# Patient Record
Sex: FEMALE | Race: WHITE
[De-identification: names, ages, dates, MRNs, and addresses within clinical notes are randomized per-mention and may not be internally consistent; named-entity substitution may affect disease eponyms.]

---
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
## 2019-04-06 ENCOUNTER — HOSPITAL ENCOUNTER (EMERGENCY)
Dept: HOSPITAL 61 - ER | Age: 19
Discharge: HOME | End: 2019-04-06
Payer: COMMERCIAL

## 2019-04-06 VITALS — HEIGHT: 65 IN | WEIGHT: 185 LBS | BODY MASS INDEX: 30.82 KG/M2

## 2019-04-06 VITALS — SYSTOLIC BLOOD PRESSURE: 146 MMHG | DIASTOLIC BLOOD PRESSURE: 75 MMHG

## 2019-04-06 DIAGNOSIS — N39.0: Primary | ICD-10-CM

## 2019-04-06 LAB
APTT PPP: YELLOW S
BACTERIA #/AREA URNS HPF: (no result) /HPF
BILIRUB UR QL STRIP: NEGATIVE
FIBRINOGEN PPP-MCNC: (no result) MG/DL
NITRITE UR QL STRIP: POSITIVE
PH UR STRIP: 6 [PH]
PROT UR STRIP-MCNC: 30 MG/DL
RBC #/AREA URNS HPF: (no result) /HPF (ref 0–2)
SQUAMOUS #/AREA URNS LPF: (no result) /LPF
UROBILINOGEN UR-MCNC: 0.2 MG/DL
WBC #/AREA URNS HPF: (no result) /HPF (ref 0–4)

## 2019-04-06 PROCEDURE — 87186 SC STD MICRODIL/AGAR DIL: CPT

## 2019-04-06 PROCEDURE — 81001 URINALYSIS AUTO W/SCOPE: CPT

## 2019-04-06 PROCEDURE — 81025 URINE PREGNANCY TEST: CPT

## 2019-04-06 PROCEDURE — 87086 URINE CULTURE/COLONY COUNT: CPT

## 2019-04-06 PROCEDURE — 99283 EMERGENCY DEPT VISIT LOW MDM: CPT

## 2019-04-06 NOTE — PHYS DOC
Past Medical History


Past Medical History:  No Pertinent History


 (SHILPI GARZA APRBIANCA)


Past Surgical History:  No Surgical History


Additional Past Surgical Histo:  CARDIAC ABLATION for Michele-Parkinsons-White


 (SHILPI GARZA APRBIANCA)


Alcohol Use:  Occasionally


Drug Use:  Marijuana


 (SHILPI GARZA APRBIANCA)





Adult General


Chief Complaint


Chief Complaint:  PAIN ON URINATION





[facility]


HPI





Patient is a 18  year old female with no significant medical history who 

presents to the ED today complaining of dysuria for 2 weeks. Patient denies any 

other symptoms.


 (SHILPI GARZA)





Review of Systems


Review of Systems





Constitutional: Denies fever or chills []


Eyes: Denies change in visual acuity, redness, or eye pain []


HENT: Denies nasal congestion or sore throat []


Respiratory: Denies cough or shortness of breath []


Cardiovascular: No additional information not addressed in HPI []


GI: Denies abdominal pain, nausea, vomiting, bloody stools or diarrhea []


: Reports dysuria, denies hematuria []


Musculoskeletal: Denies back pain or joint pain []


Integument: Denies rash or skin lesions []


Neurologic: Denies headache, focal weakness or sensory changes []








All other systems were reviewed and found to be within normal limits, except as 

documented in this note.


 (SHILPI GARZA APRBIANCA)





Allergies


Allergies





Allergies








Coded Allergies Type Severity Reaction Last Updated Verified


 


  No Known Drug Allergies    12/1/17 No





 (LUCRETIA FARMER MD)





Physical Exam


Physical Exam





Constitutional: Well developed, well nourished, no acute distress, non-toxic 

appearance. []


HENT: Normocephalic, atraumatic, bilateral external ears normal, oropharynx 

moist, no oral exudates, nose normal. []


Eyes: PERRLA, EOMI, conjunctiva normal, no discharge. [] 


Neck: Normal range of motion, no tenderness, supple, no stridor. [] 


Cardiovascular:Heart rate regular rhythm, no murmur []


Lungs & Thorax:  Bilateral breath sounds clear to auscultation []


Abdomen: Bowel sounds normal, soft, no tenderness, no masses, no pulsatile 

masses. [] 


Skin: Warm, dry, no erythema, no rash. [] 


Back: No tenderness, no CVA tenderness. [] 


Extremities: No tenderness, no cyanosis, no clubbing, ROM intact, no edema. [] 


Neurologic: Alert and oriented X 3, normal motor function, normal sensory 

function, no focal deficits noted. []


Psychologic: Affect normal, judgement normal, mood normal. []


 (SHILPI GARZA)





Current Patient Data


Vital Signs





 Vital Signs








  Date Time  Temp Pulse Resp B/P (MAP) Pulse Ox O2 Delivery O2 Flow Rate FiO2


 


4/6/19 16:20 98.5 89 19 146/75 (98) 98 Room Air  





 98.5       





 (LUCRETIA FARMER MD)


Lab Values





 Laboratory Tests








Test


 4/6/19


16:11 4/6/19


16:39


 


Urine Collection Type Unknown   


 


Urine Color Yellow   


 


Urine Clarity Cloudy   


 


Urine pH 6.0   


 


Urine Specific Gravity 1.025   


 


Urine Protein


 30 mg/dL


(NEG-TRACE) 





 


Urine Glucose (UA)


 Negative mg/dL


(NEG) 





 


Urine Ketones (Stick)


 Negative mg/dL


(NEG) 





 


Urine Blood


 Moderate (NEG)


 





 


Urine Nitrite


 Positive (NEG)


 





 


Urine Bilirubin


 Negative (NEG)


 





 


Urine Urobilinogen Dipstick


 0.2 mg/dL (0.2


mg/dL) 





 


Urine Leukocyte Esterase Large (NEG)   


 


Urine RBC


 1-2 /HPF (0-2)


 





 


Urine WBC


 Tntc /HPF


(0-4) 





 


Urine Squamous Epithelial


Cells Many /LPF  


 





 


Urine Bacteria


 Many /HPF


(0-FEW) 





 


Urine Mucus Marked /LPF   


 


POC Urine HCG, Qualitative


 


 Hcg negative


(Negative)








Microbiology


4/6/19 Urine Culture - Final, Complete


         


4/6/19 Urine Culture Result 1 (DUANE) - Final, Complete


         


4/6/19 Antimicrobic Susceptibility - Final, Complete


         


 (LUCRETIA FARMER MD)


Lab Values





 Laboratory Tests








Test


 4/6/19


16:11 4/6/19


16:39


 


Urine Collection Type Unknown   


 


Urine Color Yellow   


 


Urine Clarity Cloudy   


 


Urine pH 6.0   


 


Urine Specific Gravity 1.025   


 


Urine Protein


 30 mg/dL


(NEG-TRACE) 





 


Urine Glucose (UA)


 Negative mg/dL


(NEG) 





 


Urine Ketones (Stick)


 Negative mg/dL


(NEG) 





 


Urine Blood


 Moderate (NEG)


 





 


Urine Nitrite


 Positive (NEG)


 





 


Urine Bilirubin


 Negative (NEG)


 





 


Urine Urobilinogen Dipstick


 0.2 mg/dL (0.2


mg/dL) 





 


Urine Leukocyte Esterase Large (NEG)   


 


Urine RBC


 1-2 /HPF (0-2)


 





 


Urine WBC


 Tntc /HPF


(0-4) 





 


Urine Squamous Epithelial


Cells Many /LPF  


 





 


Urine Bacteria


 Many /HPF


(0-FEW) 





 


Urine Mucus Marked /LPF   


 


POC Urine HCG, Qualitative


 


 Hcg negative


(Negative)








 (SHILPI GARZA)





EKG


EKG


[]


 (SHILPI GARZA)





Radiology/Procedures


Radiology/Procedures


[]


 (SHILPI GARZA)





Course & Med Decision Making


Course & Med Decision Making


Pertinent Labs and Imaging studies reviewed. (See chart for details)





This is a 18-year-old female patient presenting to the ED today with dysuria 

for 2 weeks. No other symptoms. Positive for UTI, discharged with cephalexin. 

Follow-up with primary care doctor in 1-2 weeks. Instructed to push fluids. 

Tylenol Motrin for pain or fever. Given prescription for Pyridium.





 (SHILPI GARZA)


Course & Med Decision Making





Staff Physician Addendum:


I was working in the ER during the course of this patient's visit.  I was 

available for consultation as needed, but I was not directly involved in the 

care of this patient.    


 (LUCRETIA FARMER MD)


Dragon Disclaimer


Dragon Disclaimer


This electronic medical record was generated, in whole or in part, using a 

voice recognition dictation system.


 (SHILPI GARZA)





Departure


Departure


Impression:  


 Primary Impression:  


 Urinary tract infection


Disposition:  01 HOME, SELF-CARE


Condition:  STABLE


Referrals:  


UNKNOWN PCP NAME (PCP)


follow up with your doctor in 1-2 weeks.


Patient Instructions:  Urinary Tract Infection





Additional Instructions:  


You have urinary tract infection,  we put on antibiotics, ensure you complete 

them. Take the prescribed medications as ordered. You can also take Tylenol/

Motrin for pain or fever. Please follow-up with your doctor in 1-2 weeks. 

Please push fluids.


Scripts


Phenazopyridine Hcl (PYRIDIUM) 100 Mg Tablet


100 MG PO TID PRN for PAIN, #9 TAB


   Prov: SHILPI GARZA         4/6/19 


Cephalexin (CEPHALEXIN) 500 Mg Tablet


1 TAB PO BID, #14 TAB


   Prov: SHILPI GARZA         4/6/19





Problem Qualifiers








 Primary Impression:  


 Urinary tract infection


 Urinary tract infection type:  site unspecified  Hematuria presence:  without 

hematuria  Qualified Codes:  N39.0 - Urinary tract infection, site not specified








SHILPI GARZA Apr 6, 2019 16:42


LUCRETIA FARMER MD Apr 13, 2019 20:20